# Patient Record
(demographics unavailable — no encounter records)

---

## 2018-03-15 NOTE — RADRPT
EXAM DATE/TIME:  03/15/2018 01:35 

 

[facility] COMPARISON:     

No previous studies available for comparison.

 

 

INDICATIONS :     

Cephalgia.

                      

 

RADIATION DOSE:     

59.98 CTDIvol (mGy) 

 

 

 

MEDICAL HISTORY :     

Hypertension.  

 

SURGICAL HISTORY :      

None. 

 

ENCOUNTER:      

Initial

 

ACUITY:      

1 day

 

PAIN SCALE:      

5/10

 

LOCATION:        

cranial 

 

TECHNIQUE:     

Multiple contiguous axial images were obtained of the head.  Using automated exposure control and adj
ustment of the mA and/or kV according to patient size, radiation dose was kept as low as reasonably a
chievable to obtain optimal diagnostic quality images.   DICOM format image data is available electro
nically for review and comparison.  

 

FINDINGS:     

 

CEREBRUM:     

The ventricles are normal for age.  No evidence of midline shift, mass lesion, hemorrhage or acute in
farction.  No extra-axial fluid collections are seen.

 

POSTERIOR FOSSA:     

The cerebellum and brainstem are intact.  The 4th ventricle is midline.  The cerebellopontine angle i
s unremarkable.

 

EXTRACRANIAL:     

The visualized portion of the orbits is intact. Complete opacification of the ethmoid air cells on th
e left. No bony destruction. Mild mucosal thickening involving the left maxillary sinus without air-f
luid level. Remaining paranasal sinuses and mastoid air cells are clear.

 

SKULL:     

The calvaria is intact.  No evidence of skull fracture.

 

CONCLUSION:     

1. No acute intracranial abnormality.

2. Left ethmoid and left maxillary sinus disease.

 

 

 

 Enmanuel Duncan Jr., MD on March 15, 2018 at 1:47           

Board Certified Radiologist.

 This report was verified electronically.

## 2018-03-15 NOTE — PD
HPI


Chief Complaint:  HTN


Time Seen by Provider:  00:40


Travel History


International Travel<30 days:  No


Contact w/Intl Traveler<30days:  No


Traveled to known affect area:  No





History of Present Illness


HPI


Patient called 911, she complained of slight head throbbing which she usually 

feels when her blood pressure is up.  She checked her blood pressure which read 

as 200 systolic and the patient call 911 for transportation and further 

assistance


This is all that the patient is sharing at this time.  Patient states that she 

was diagnosed with sinusitis and is on Levaquin for it.








Allergic to atorvastatin, Z-Latrell and Coumadin


Past medical history significant for hypercholesterolemia tachyarrhythmia, DVT, 

cholecystectomy, hiatal hernia, hysterectomy, anxiety, depression,





PFSH


Past Medical History


Arthritis:  Yes


Anxiety:  Yes (TAKES VALIUM OCCASIONALLY FOR SLEEP)


Depression:  Yes


Cancer:  No


Cardiovascular Problems:  Yes (HX TACHYARRHYTHMIA)


High Cholesterol:  Yes


Diabetes:  No


Diminished Hearing:  No


Deep Vein Thrombosis:  Yes


Endocrine:  No


Gastrointestinal Disorders:  No (CURRENT ABDOMINAL PAIN)


Genitourinary:  No


Headaches:  Yes


Hepatitis:  No


Hiatal Hernia:  Yes


Hypertension:  Yes


Immune Disorder:  No


Implanted Vascular Access Dvce:  No


Musculoskeletal:  Yes (DDD, ARTHRITIS)


Neurologic:  Yes (POST HERPETIC TRIGEMINAL NEURALGIA, HEADACHES)


Psychiatric:  Yes (ANXIETY, CLAUSTRAPHOBIA)


Reproductive:  No


Respiratory:  No


Seizures:  No


Thyroid Disease:  No


Menopausal:  Yes





Past Surgical History


Abdominal Surgery:  Yes ( CIELO. ING. HERNIA REP.ABD. HERNIA REP. x3, 

CHOLECYSTECTOMY)


AICD:  No


Cardiac Surgery:  No


Cholecystectomy:  Yes


Ear Surgery:  No


Endocrine Surgery:  No


Eye Surgery:  Yes (L CATARACT )


Genitourinary Surgery:  No


Gynecologic Surgery:  Yes (HYSTERECTOMY)


Hysterectomy:  Yes (2000)


Joint Replacement:  No


Neurologic Surgery:  No


Oral Surgery:  No


Pacemaker:  No


Thoracic Surgery:  No


Other Surgery:  Yes (HYSTERECTOMY, CHOLECYSTECTOMY, HERNIA REPAIR)





Social History


Alcohol Use:  Yes (RARELY)


Tobacco Use:  No


Substance Use:  No





Allergies-Medications


(Allergen,Severity, Reaction):  


Coded Allergies:  


     atorvastatin (Verified  Allergy, Severe, MUSCLE PAIN, 3/15/18)


     warfarin (Verified  Allergy, Severe, HIVES, 3/15/18)


     azithromycin (Verified  Adverse Reaction, Severe, abd pain, 3/15/18)


Reported Meds & Prescriptions





Reported Meds & Active Scripts


Active


Reported


Levofloxacin 750 Mg Tablet 750 Mg PO DAILY


Ipratropium Nasal 0.06% Spray 1 Spray EACH NARE QID


Valacyclovir (Valacyclovir HCl) 1,000 Mg Tab 1,000 Mg PO BID


Trazodone (Trazodone HCl) 50 Mg Tab 50 Mg PO HS


Rosuvastatin (Rosuvastatin Calcium) 10 Mg Tab 10 Mg PO HS


Hydrochlorothiazide 25 Mg Tab 25 Mg PO DAILY


Diazepam 5 Mg Tab 2.5 Mg PO HS PRN


Citalopram HBr (Citalopram Hydrobromide) 20 Mg/10 Ml Solution 10 Mg PO DAILY


Carbamazepine 100 Mg Chew 100 Mg CHEW BID


Aspirin 81 Low Dose (Aspirin) 81 Mg Chew 81 Mg CHEW DAILY


Amlodipine (Amlodipine Besylate) 5 Mg Tab 5 Mg PO DAILY








Review of Systems


General / Constitutional:  No: Fever


Eyes:  No: Visual changes


HENT:  Positive: Headaches


Cardiovascular:  No: Chest Pain or Discomfort


Respiratory:  No: Shortness of Breath


Gastrointestinal:  No: Abdominal Pain


Genitourinary:  No: Dysuria


Musculoskeletal:  No: Pain


Skin:  No Rash


Neurologic:  No: Weakness


Psychiatric:  No: Depression


Endocrine:  No: Polydipsia


Hematologic/Lymphatic:  No: Easy Bruising





Physical Exam


Narrative


GENERAL: 


SKIN: Warm and dry.


HEAD: Atraumatic. Normocephalic. 


EYES: Pupils equal and round. No scleral icterus. No injection or drainage. 


ENT: No nasal bleeding or discharge.  Mucous membranes pink and moist.


NECK: Trachea midline. No JVD. 


CARDIOVASCULAR: Regular rate and rhythm.  


RESPIRATORY: No accessory muscle use. Clear to auscultation. Breath sounds 

equal bilaterally. 


GASTROINTESTINAL: Abdomen soft, non-tender, nondistended. 


MUSCULOSKELETAL: Extremities without clubbing, cyanosis, or edema. No obvious 

deformities. 


NEUROLOGICAL: Awake and alert. No obvious cranial nerve deficits.  Motor 

grossly within normal limits. Five out of 5 muscle strength in the arms and 

legs.  Normal speech.


PSYCHIATRIC: Appropriate mood and affect; insight and judgment normal.





Data


Data


Last Documented VS





Vital Signs








  Date Time  Temp Pulse Resp B/P (MAP) Pulse Ox O2 Delivery O2 Flow Rate FiO2


 


3/15/18 03:34  69 16 132/74 (93) 99   


 


3/15/18 02:00      Room Air  


 


3/15/18 00:40 97.5       








Orders





 Orders


Clonidine (Catapres) (3/15/18 00:45)


Ct Brain W/O Iv Contrast(Rout) (3/15/18 00:40)


Lorazepam Inj (Ativan Inj) (3/15/18 02:15)


Ed Discharge Order (3/15/18 02:27)








[facility]


Medical Decision Making


Medical Screen Exam Complete:  Yes


Emergency Medical Condition:  Yes


Medical Record Reviewed:  Yes


Differential Diagnosis


Hypertensive urgency versus intracranial hemorrhage versus sinus headache with 

secondary hypertension


Narrative Course


CT head read by the radiologist as no acute intracranial abnormality, left 

ethmoid and left maxillary sinus disease.





The sinusitis is known to the patient who is already being treated with Levaquin


Patient's hypertension resolved with a single clonidine from systolic of 200s 

down to a systolic of 152





Diagnosis





 Primary Impression:  


 Hypertension


 Qualified Codes:  I10 - Essential (primary) hypertension


Patient Instructions:  General Instructions, Hypertension (DC), Sinusitis (GEN)





***Additional Instructions:  


CONTINUE TAKING YOUR ANTIBIOTIC FOR YOUR SINUSITIS AND SEE YOUR REGULAR DOCTOR 

FOR ANY FURTHER EVALUATION AND CONTROL OF YOUR BLOOD PRESSURE


Disposition:  01 DISCHARGE HOME


Condition:  Stable











Bradly Bravo MD Mar 15, 2018 00:46

## 2018-03-15 NOTE — PD
HPI


Chief Complaint:  GI Complaint


Time Seen by Provider:  23:08


Travel History


International Travel<30 days:  No


Contact w/Intl Traveler<30days:  No


Traveled to known affect area:  No





History of Present Illness


HPI


Patient is an 81-year-old female presents emergency department for evaluation 

of nausea and vomiting.  Patient was seen here yesterday for headache and 

elevated blood pressure, she received clonidine and a CAT scan of her head 

which showed some mild sinusitis and she was started on antibiotic.  She states 

after taking the antibiotic today she started having some nausea and vomiting.  

No labs were checked on her yesterday.  She also admits to having very anxious 

personality.  States she has some mild abdominal cramping but no severe pain.  

She states that her headache is fairly mild currently and is coronal and 

distribution.  She denies any chest pain shortness of breath diarrhea 

constipation extremity pain.  She does endorse tremors





PFSH


Past Medical History


Arthritis:  Yes


Anxiety:  Yes (TAKES VALIUM OCCASIONALLY FOR SLEEP)


Depression:  Yes


Cancer:  No


Cardiovascular Problems:  Yes (HX TACHYARRHYTHMIA)


High Cholesterol:  Yes


Diabetes:  No


Diminished Hearing:  No


Deep Vein Thrombosis:  Yes


Endocrine:  No


Genitourinary:  No


Headaches:  Yes


Hepatitis:  No


Hiatal Hernia:  Yes


Hypertension:  Yes


Immune Disorder:  No


Implanted Vascular Access Dvce:  No


Musculoskeletal:  Yes (DDD, ARTHRITIS)


Neurologic:  Yes (POST HERPETIC TRIGEMINAL NEURALGIA, HEADACHES)


Psychiatric:  Yes (ANXIETY, CLAUSTRAPHOBIA)


Reproductive:  No


Respiratory:  No


Seizures:  No


Thyroid Disease:  No


Pregnant?:  Not Pregnant


Menopausal:  Yes





Past Surgical History


Abdominal Surgery:  Yes ( CIELO. ING. HERNIA REP.ABD. HERNIA REP. x3, 

CHOLECYSTECTOMY)


AICD:  No


Cardiac Surgery:  No


Cholecystectomy:  Yes


Ear Surgery:  No


Endocrine Surgery:  No


Eye Surgery:  Yes (L CATARACT )


Genitourinary Surgery:  No


Gynecologic Surgery:  Yes (HYSTERECTOMY)


Hysterectomy:  Yes (2000)


Joint Replacement:  No


Neurologic Surgery:  No


Oral Surgery:  No


Pacemaker:  No


Thoracic Surgery:  No


Other Surgery:  Yes (HYSTERECTOMY, CHOLECYSTECTOMY, HERNIA REPAIR)





Social History


Alcohol Use:  Yes (RARELY)


Tobacco Use:  No


Substance Use:  No





Allergies-Medications


(Allergen,Severity, Reaction):  


Coded Allergies:  


     atorvastatin (Verified  Allergy, Severe, MUSCLE PAIN, 3/16/18)


     warfarin (Verified  Allergy, Severe, HIVES, 3/16/18)


     azithromycin (Verified  Adverse Reaction, Severe, abd pain, 3/16/18)


Reported Meds & Prescriptions





Reported Meds & Active Scripts


Active


Reported


Levofloxacin 750 Mg Tablet 750 Mg PO DAILY


Ipratropium Nasal 0.06% Spray 1 Spray EACH NARE QID


Trazodone (Trazodone HCl) 50 Mg Tab 50 Mg PO HS


Rosuvastatin (Rosuvastatin Calcium) 10 Mg Tab 10 Mg PO HS


Hydrochlorothiazide 25 Mg Tab 25 Mg PO DAILY


Diazepam 5 Mg Tab 2.5 Mg PO HS PRN


Citalopram HBr (Citalopram Hydrobromide) 20 Mg/10 Ml Solution 10 Mg PO DAILY


Carbamazepine 100 Mg Chew 100 Mg CHEW BID


Aspirin 81 Low Dose (Aspirin) 81 Mg Chew 81 Mg CHEW DAILY


Amlodipine (Amlodipine Besylate) 5 Mg Tab 5 Mg PO DAILY








Review of Systems


Except as stated in HPI:  all other systems reviewed are Neg





Physical Exam


Narrative


GENERAL: Well-developed well-nourished, mildly tremulous, anxious.


SKIN: Focused skin assessment warm/dry.


HEAD: Atraumatic. Normocephalic. 


EYES: Pupils equal and round. No scleral icterus. No injection or drainage. 


ENT: No nasal bleeding or discharge.  Mucous membranes pink and moist.


NECK: Trachea midline. No JVD. 


CARDIOVASCULAR: Regular rate and rhythm.  No murmur appreciated.


RESPIRATORY: No accessory muscle use. Clear to auscultation. Breath sounds 

equal bilaterally. 


GASTROINTESTINAL: Abdomen soft, non-tender, nondistended. Hepatic and splenic 

margins not palpable.  She has demonstrated dry heaves a few times in the 

emergency department


MUSCULOSKELETAL: No obvious deformities. No clubbing.  No cyanosis.  No edema. 


NEUROLOGICAL: Awake and alert.  Cranial nerves II through XII grossly intact 

and nonfocal, 5 out of 5 strength in all 4 extremities, the patient is 

exhibiting some mild essential tremor of bilateral upper extremities but her 

cerebellar testing is normal.


PSYCHIATRIC: Appropriate mood and affect; insight and judgment normal.





Data


Data


Last Documented VS





Vital Signs








  Date Time  Temp Pulse Resp B/P (MAP) Pulse Ox O2 Delivery O2 Flow Rate FiO2


 


3/16/18 00:18  72 20 155/73 (100) 97   


 


3/15/18 23:59      Room Air  


 


3/15/18 22:08 97.8       








Orders





 Orders


Complete Blood Count With Diff (3/15/18 23:14)


Comprehensive Metabolic Panel (3/15/18 23:14)


Lipase (3/15/18 23:14)


Prothrombin Time / Inr (Pt) (3/15/18 23:14)


Act Partial Throm Time (Ptt) (3/15/18 23:14)


Urinalysis - C+S If Indicated (3/15/18 23:14)


Iv Access Insert/Monitor (3/15/18 23:14)


Ecg Monitoring (3/15/18 23:14)


Oximetry (3/15/18 23:14)


Sodium Chloride 0.9% Flush (Ns Flush) (3/15/18 23:15)


Lorazepam Inj (Ativan Inj) (3/15/18 23:15)


Ondansetron Inj (Zofran Inj) (3/15/18 23:30)


Ondansetron Inj (Zofran Inj) (3/16/18 00:45)


Sodium, Random Urine (3/16/18 00:45)


Osmolality, Urine (3/16/18 00:45)


Sodium Chlor 0.9% 1000 Ml Inj (Ns 1000 M (3/16/18 00:45)


Ct Abd/Pel W Iv Contrast(Rout) (3/16/18 00:56)


Iohexol 350 Inj (Omnipaque 350 Inj) (3/16/18 01:28)


Admit Order (Ed Use Only) (3/16/18 )


Vital Signs (Adult) Q4H (3/16/18 02:46)


Diet Heart Healthy (3/16/18 Breakfast)


Notify Dr: Other (3/16/18 02:46)


Ondansetron Inj (Zofran Inj) (3/16/18 03:00)


Activity Oob With Assistance (3/16/18 02:46)





Labs





Laboratory Tests








Test


  3/15/18


23:15 3/15/18


23:45 3/16/18


00:00


 


White Blood Count 8.4 TH/MM3   


 


Red Blood Count 4.59 MIL/MM3   


 


Hemoglobin 13.2 GM/DL   


 


Hematocrit 38.8 %   


 


Mean Corpuscular Volume 84.6 FL   


 


Mean Corpuscular Hemoglobin 28.8 PG   


 


Mean Corpuscular Hemoglobin


Concent 34.1 % 


  


  


 


 


Red Cell Distribution Width 13.4 %   


 


Platelet Count 214 TH/MM3   


 


Mean Platelet Volume 7.7 FL   


 


Neutrophils (%) (Auto) 79.9 %   


 


Lymphocytes (%) (Auto) 12.5 %   


 


Monocytes (%) (Auto) 6.1 %   


 


Eosinophils (%) (Auto) 0.7 %   


 


Basophils (%) (Auto) 0.8 %   


 


Neutrophils # (Auto) 6.6 TH/MM3   


 


Lymphocytes # (Auto) 1.1 TH/MM3   


 


Monocytes # (Auto) 0.5 TH/MM3   


 


Eosinophils # (Auto) 0.1 TH/MM3   


 


Basophils # (Auto) 0.1 TH/MM3   


 


CBC Comment DIFF FINAL   


 


Differential Comment    


 


Prothrombin Time 10.0 SEC   


 


Prothromb Time International


Ratio 1.0 RATIO 


  


  


 


 


Activated Partial


Thromboplast Time 24.0 SEC 


  


  


 


 


Blood Urea Nitrogen 10 MG/DL   


 


Creatinine 0.73 MG/DL   


 


Random Glucose 117 MG/DL   


 


Total Protein 6.9 GM/DL   


 


Albumin 3.4 GM/DL   


 


Calcium Level 8.6 MG/DL   


 


Alkaline Phosphatase 106 U/L   


 


Aspartate Amino Transf


(AST/SGOT) 22 U/L 


  


  


 


 


Alanine Aminotransferase


(ALT/SGPT) 16 U/L 


  


  


 


 


Total Bilirubin 0.4 MG/DL   


 


Sodium Level 121 MEQ/L   


 


Potassium Level 3.7 MEQ/L   


 


Chloride Level 87 MEQ/L   


 


Carbon Dioxide Level 24.5 MEQ/L   


 


Anion Gap 10 MEQ/L   


 


Estimat Glomerular Filtration


Rate 77 ML/MIN 


  


  


 


 


Lipase 67 U/L   


 


Urine Color  YELLOW  


 


Urine Turbidity  CLEAR  


 


Urine pH  6.5  


 


Urine Specific Gravity  1.015  


 


Urine Protein  NEG mg/dL  


 


Urine Glucose (UA)  NEG mg/dL  


 


Urine Ketones  15 mg/dL  


 


Urine Occult Blood  NEG  


 


Urine Nitrite  NEG  


 


Urine Bilirubin  NEG  


 


Urine Urobilinogen  0.2 MG/DL  


 


Urine Leukocyte Esterase  NEG  


 


Urine RBC  0-3 /hpf  


 


Urine WBC  3-5 /hpf  


 


Urine Squamous Epithelial


Cells 


  0-5 /hpf 


  


 


 


Urine Bacteria  NONE /hpf  


 


Microscopic Urinalysis Comment


  


  CULT NOT


INDICATED 


 


 


Urine Osmolality   499 MOSM/KG 


 


Urine Random Sodium   123 MEQ/L 











[facility]


Medical Decision Making


Medical Screen Exam Complete:  Yes


Emergency Medical Condition:  Yes


Differential Diagnosis


Dehydration, electrolyte abnormality, anxiety, acute abdomen unlikely.


Narrative Course


Patient was room to the emergency department, her laboratory workup was 

significant for hyponatremia with a sodium of 121 which may explain her 

tremors.  She is a very anxious person, her blood pressure was significantly 

elevated on arrival but after Ativan was administered IV the patient's blood 

pressure normalized and her tremor ceased.  She has not had any seizure 

activity while in the emergency department is alert and awake and oriented.  

CAT scan of her head was reviewed from yesterday and certainly did show some 

mild chronic sinusitis but nothing acute intracranially.  Her headache had 

completely resolved in the emergency department.  He was fairly mild and she 

did not have any nuchal rigidity.  I do not think there is indication for 

lumbar puncture in this patient at this time.  Patient did have a CT of her 

abdomen given the dry heaves and her sodium and there is no acute intra-

abdominal pathology.


Last 24 hours Impressions








Abdomen/Pelvis CT 3/16/18 0056 Signed





Impressions: 





 Service Date/Time:  Friday, March 16, 2018 01:08 - CONCLUSION:  1. No acute 





 intraperitoneal or pelvic process to explain current clinical symptoms. 2. 





 Stable nature and extrahepatic biliary ductal dilatation is likely related to 





 prior cholecystectomy. 3. Small hiatal hernia. 4. Prior mesh repair of a small 





 anterior abdominal wall hernia. Superior to be at the cephalad extent of the 





 previous midline laparotomy scar.     Dannie Cheng MD 








Results were discussed with the patient I recommend observing her overnight in 

the hospital.  Given her nausea and vomiting I think likely this is hypovolemic 

hyponatremia and she was given 500 cc bolus of normal saline.  Urine sodium and 

urine osmolality were sent as well.  Patient was discussed with Dr. Damon for admission.





Diagnosis





 Primary Impression:  


 Hyponatremia


 Additional Impressions:  


 N&V (nausea and vomiting)


 Headache





Admitting Information


Admitting Physician Requests:  Admit


Condition:  Stable











Pelon Mims MD Mar 15, 2018 23:18

## 2018-03-16 NOTE — RADRPT
EXAM DATE/TIME:  03/16/2018 01:08 

 

[facility] COMPARISON:     

CT ABDOMEN & PELVIS W CONTRAST, November 16, 2014, 21:26.

 

 

INDICATIONS :     

Nausea and vomiting. Lower abdominal pain.

                      

 

IV CONTRAST:     

100 cc Omnipaque 350 (iohexol) IV 

 

 

ORAL CONTRAST:      

No oral contrast ingested.

                      

 

RADIATION DOSE:     

20.65 CTDIvol (mGy) 

 

 

MEDICAL HISTORY :     

Cardiovascular disease.  

 

SURGICAL HISTORY :      

Cholecystectomy. Inguinal hernia repair.Hysterectomy.

 

ENCOUNTER:      

Initial

 

ACUITY:      

1 day

 

PAIN SCALE:      

8/10

 

LOCATION:       

Bilateral lower quadrant 

 

TECHNIQUE:     

Volumetric scanning of the abdomen and pelvis was performed.  Using automated exposure control and ad
justment of the mA and/or kV according to patient size, radiation dose was kept as low as reasonably 
achievable to obtain optimal diagnostic quality images.  DICOM format image data is available electro
nically for review and comparison.  

 

FINDINGS:     

 

LOWER LUNGS:     

The visualized lower lungs are clear. Small hiatal hernia.

 

LIVER:     

Homogeneous density without lesion. There is some intra-and extra hepatic biliary ductal dilatation p
robably representing a capacitance effect associated with prior cholecystectomy.

 

SPLEEN:     

Normal size without lesion.

 

PANCREAS:     

Within normal limits.

 

KIDNEYS:     

Normal in size and shape.  There is no mass, stone or hydronephrosis. Small cortical cyst on the left


 

ADRENAL GLANDS:     

Within normal limits.

 

VASCULAR:     

There is no aortic aneurysm. Atherosclerotic disease in the central portion of both renal arteries wi
th a possible ostial stenosis on the right.

 

BOWEL/MESENTERY:     

The stomach, small bowel, and colon demonstrate no acute abnormality.  There is no free intraperitone
al air or fluid.

 

ABDOMINAL WALL:     

Findings characteristic of a previous midline laparotomy scar with probable mesh repair of a previous
 incisional hernia in the same general vicinity.

 

RETROPERITONEUM:     

There is no lymphadenopathy. 

 

BLADDER:     

No wall thickening or mass. 

 

REPRODUCTIVE:     

Patient appears to be status post hysterectomy.

 

INGUINAL:     

There is no lymphadenopathy or hernia. 

 

MUSCULOSKELETAL:     

Facet hypertrophy again identified at the lumbosacral junction with a mild dextroscoliosis of the lum
bar spine. 

 

CONCLUSION:     

1. No acute intraperitoneal or pelvic process to explain current clinical symptoms.

2. Stable nature and extrahepatic biliary ductal dilatation is likely related to prior cholecystectom
y.

3. Small hiatal hernia.

4. Prior mesh repair of a small anterior abdominal wall hernia. Superior to be at the cephalad extent
 of the previous midline laparotomy scar.

 

 

 

 Dannie Cheng MD on March 16, 2018 at 2:21           

Board Certified Radiologist.

 This report was verified electronically.

## 2018-03-16 NOTE — HHI.HP
HPI


Service


[facility]


Primary Care Physician


Barb Harrison MD


Admission Diagnosis





Symptomatic hyponatremia.  N/V


Chief Complaint:  


nausea and vomit


Travel History


International Travel<30 Days:  No


Contact w/Intl Traveler <30 Da:  No


Traveled to Known Affected Are:  No


History of Present Illness





Patient is an 81-year-old female presents emergency department for evaluation 

of nausea and vomiting.  Patient was seen here yesterday for headache and 

elevated blood pressure, she received clonidine and a CAT scan of her head 

which showed some mild sinusitis and she was started on antibiotic.  She states 

after taking the antibiotic today she started having some nausea and vomiting.  

No labs were checked on her yesterday.  She also admits to having very anxious 

personality.  States she has some mild abdominal cramping but no severe pain.  

She states that her headache is fairly mild currently and is coronal and 

distribution.  She denies any chest pain shortness of breath diarrhea 

constipation extremity pain.  She does endorse tremors.  Patient with history 

of hypertension and is supposed to be on bid 5mg amlodipine will restart at 

that dose and was on levaquin at 750 which started her symptoms also unable to 

tolerate zithromax has been on amoxicillin in past will start for sinus.  

Patient sodium low and started on IV fluid related to possible diuretic and the 

nausea and vomit has been normal in past.  will start on po intake continue IV 

fluid recheck lab in am if stable discharge tomorrow.





Review of Systems


Gastrointestinal:  COMPLAINS OF: Nausea, Vomiting


Psychiatric:  COMPLAINS OF: Anxiety





Past Family Social History


Past Medical History


hypertension,DVT,depression,tachyarrhythmia,hernia djd neuralgia


Past Surgical History


gallbladder,hysterectomy,cataract


Reported Medications


[id number],valium 2.5,crestor 10 citaloprim 10,nasal spray,trazadone 50,

amlodipine 5 bid hctz 25 asa 81 carbamazpine 100 bid


Allergies:  


Coded Allergies:  


     atorvastatin (Verified  Allergy, Severe, MUSCLE PAIN, 3/16/18)


     warfarin (Verified  Allergy, Severe, HIVES, 3/16/18)


     azithromycin (Verified  Adverse Reaction, Severe, abd pain, 3/16/18)


Social History


NS,ND





Physical Exam


Vital Signs





Vital Signs








  Date Time  Temp Pulse Resp B/P (MAP) Pulse Ox O2 Delivery O2 Flow Rate FiO2


 


3/16/18 08:00 96.4 60 16 132/63 (86) 96   


 


3/16/18 04:42 96.8 74 18 145/78 (100) 96   


 


3/16/18 04:27  68  159/74 (102) 96   


 


3/16/18 02:30  80 20 171/84 (113) 98   


 


3/16/18 01:30  75 20 167/81 (109) 97   


 


3/16/18 00:18  72 20 155/73 (100) 97   


 


3/15/18 23:59  72 20 155/73 (100) 97 Room Air  


 


3/15/18 23:59   20     


 


3/15/18 23:30  80 20 176/73 (107) 98   


 


3/15/18 22:08 97.8 75 18 180/79 (112) 97   








Physical Exam


GENERAL: This is a well-nourished, well-developed patient, in no apparent 

distress.


SKIN: No rashes, ecchymoses or lesions. Cool and dry.


HEAD: Atraumatic. Normocephalic. No temporal or scalp tenderness.


EYES: Pupils equal round and reactive. Extraocular motions intact. No scleral 

icterus. No injection or drainage. 


ENT: Nose without bleeding, purulent drainage or septal hematoma. Throat 

without erythema, tonsillar hypertrophy or exudate. Uvula midline. Airway 

patent.


NECK: Trachea midline. No JVD or lymphadenopathy. Supple, nontender, no 

meningeal signs.


CARDIOVASCULAR: Regular rate and rhythm without murmurs, gallops, or rubs. 


RESPIRATORY: Clear to auscultation. Breath sounds equal bilaterally. No wheezes

, rales, or rhonchi.  


GASTROINTESTINAL: Abdomen soft, non-tender, nondistended. No hepato-splenomegaly

, or palpable masses. No guarding.


MUSCULOSKELETAL: Extremities without clubbing, cyanosis, or edema. No joint 

tenderness, effusion, or edema noted. No calf tenderness. Negative Homans sign 

bilaterally.


NEUROLOGICAL: Awake and alert. Cranial nerves II through XII intact.  Motor and 

sensory grossly within normal limits. Five out of 5 muscle strength in all 

muscle groups.  Normal speech.


Laboratory





Laboratory Tests








Test


  3/15/18


23:15 3/15/18


23:45 3/16/18


00:00


 


White Blood Count 8.4   


 


Red Blood Count 4.59   


 


Hemoglobin 13.2   


 


Hematocrit 38.8   


 


Mean Corpuscular Volume 84.6   


 


Mean Corpuscular Hemoglobin 28.8   


 


Mean Corpuscular Hemoglobin


Concent 34.1 


  


  


 


 


Red Cell Distribution Width 13.4   


 


Platelet Count 214   


 


Mean Platelet Volume 7.7   


 


Neutrophils (%) (Auto) 79.9   


 


Lymphocytes (%) (Auto) 12.5   


 


Monocytes (%) (Auto) 6.1   


 


Eosinophils (%) (Auto) 0.7   


 


Basophils (%) (Auto) 0.8   


 


Neutrophils # (Auto) 6.6   


 


Lymphocytes # (Auto) 1.1   


 


Monocytes # (Auto) 0.5   


 


Eosinophils # (Auto) 0.1   


 


Basophils # (Auto) 0.1   


 


CBC Comment DIFF FINAL   


 


Differential Comment    


 


Prothrombin Time 10.0   


 


Prothromb Time International


Ratio 1.0 


  


  


 


 


Activated Partial


Thromboplast Time 24.0 


  


  


 


 


Blood Urea Nitrogen 10   


 


Creatinine 0.73   


 


Random Glucose 117   


 


Total Protein 6.9   


 


Albumin 3.4   


 


Calcium Level 8.6   


 


Alkaline Phosphatase 106   


 


Aspartate Amino Transf


(AST/SGOT) 22 


  


  


 


 


Alanine Aminotransferase


(ALT/SGPT) 16 


  


  


 


 


Total Bilirubin 0.4   


 


Sodium Level 121   


 


Potassium Level 3.7   


 


Chloride Level 87   


 


Carbon Dioxide Level 24.5   


 


Anion Gap 10   


 


Estimat Glomerular Filtration


Rate 77 


  


  


 


 


Lipase 67   


 


Urine Color  YELLOW  


 


Urine Turbidity  CLEAR  


 


Urine pH  6.5  


 


Urine Specific Gravity  1.015  


 


Urine Protein  NEG  


 


Urine Glucose (UA)  NEG  


 


Urine Ketones  15  


 


Urine Occult Blood  NEG  


 


Urine Nitrite  NEG  


 


Urine Bilirubin  NEG  


 


Urine Urobilinogen  0.2  


 


Urine Leukocyte Esterase  NEG  


 


Urine RBC  0-3  


 


Urine WBC  3-5  


 


Urine Squamous Epithelial


Cells 


  0-5 


  


 


 


Urine Bacteria  NONE  


 


Microscopic Urinalysis Comment


  


  CULT NOT


INDICATED 


 


 


Urine Osmolality   499 


 


Urine Random Sodium   123 








Result Diagram:  


3/15/18 2315                                                                   

             3/15/18 2315





Imaging


CT head sinusitis,cxr no acute changes ekg no acute changes


Course


started on IV fluid and zofran





Septic Shock Reassessment


Septic shock perfusion:  reassessment completed





Caprini VTE Risk Assessment


Caprini VTE Risk Assessment:  Mod/High Risk (score >= 2)


Caprini Risk Assessment Model











 Point Value = 1          Point Value = 2  Point Value = 3  Point Value = 5


 


Age 41-60


Minor surgery


BMI > 25 kg/m2


Swollen legs


Varicose veins


Pregnancy or postpartum


History of unexplained or recurrent


   spontaneous 


Oral contraceptives or hormone


   replacement


Sepsis (< 1 month)


Serious lung disease, including


   pneumonia (< 1 month)


Abnormal pulmonary function


Acute myocardial infarction


Congestive heart failure (< 1 month)


History of inflammatory bowel disease


Medical patient at bed rest Age 61-74


Arthroscopic surgery


Major open surgery (> 45 min)


Laparoscopic surgery (> 45 min)


Malignancy


Confined to bed (> 72 hours)


Immobilizing plaster cast


Central venous access Age >= 75


History of VTE


Family history of VTE


Factor V Leiden


Prothrombin [id number]


Lupus anticoagulant


Anticardiolipin antibodies


Elevated serum homocysteine


Heparin-induced thrombocytopenia


Other congenital or acquired


   thrombophilia Stroke (< 1 month)


Elective arthroplasty


Hip, pelvis, or leg fracture


Acute spinal cord injury (< 1 month)








Prophylaxis Regimen











   Total Risk


Factor Score Risk Level Prophylaxis Regimen


 


0-1      Low Early ambulation


 


2 Moderate Order ONE of the following:


*Sequential Compression Device (SCD)


*Heparin 5000 units SQ BID


 


3-4 Higher Order ONE of the following medications:


*Heparin 5000 units SQ TID


*Enoxaparin/Lovenox 40 mg SQ daily (WT < 150 kg, CrCl > 30 mL/min)


*Enoxaparin/Lovenox 30 mg SQ daily (WT < 150 kg, CrCl > 10-29 mL/min)


*Enoxaparin/Lovenox 30 mg SQ BID (WT < 150 kg, CrCl > 30 mL/min)


AND/OR


*Sequential Compression Device (SCD)


 


5 or more Highest Order ONE of the following medications:


*Heparin 5000 units SQ TID (Preferred with Epidurals)


*Enoxaparin/Lovenox 40 mg SQ daily (WT < 150 kg, CrCl > 30 mL/min)


*Enoxaparin/Lovenox 30 mg SQ daily (WT < 150 kg, CrCl > 10-29 mL/min)


*Enoxaparin/Lovenox 30 mg SQ BID (WT < 150 kg, CrCl > 30 mL/min)


AND


*Sequential Compression Device (SCD)











Assessment and Plan


Problem List:  


(1) Hyponatremia


ICD Codes:  E87.1 - Hypo-osmolality and hyponatremia


Status:  Acute


Plan:  probably related to N and v and is on diuretic will hold diuretic give 

IV fluid follow up labs try po intake





(2) N&V (nausea and vomiting)


ICD Codes:  R11.2 - Nausea with vomiting, unspecified


Status:  Acute


Plan:  a little better with zofran will continue for now





(3) Hypertension


ICD Codes:  I10 - Hypertension


Status:  Acute


Plan:  continue amlodipine was given clonidine in er yesterday which also make 

her not feel well if needed can add losartan





(4) Sinusitis


ICD Codes:  J32.9 - Chronic sinusitis, unspecified


Plan:  will stop levaquin and start amoxicillin





Assessment and Plan


as aboe recheck labs


Code Status


full


Discussed Condition With


patient 





Physician Certification


2 Midnight Certification Type:  Admission for Inpatient Services


Order for Inpatient Services


The services are ordered in accordance with Medicare regulations or non-

Medicare payer requirements, as applicable.  In the case of services not 

specified as inpatient-only, they are appropriately provided as inpatient 

services in accordance with the 2-midnight benchmark.


Estimated LOS (days):  2


2 days is the estimated time the patient will need to remain in the hospital, 

assuming treatment plan goals are met and no additional complications.


Post-Hospital Plan:  Not yet determined











Reji Damon MD Mar 16, 2018 11:18

## 2018-03-16 NOTE — EKG
Date Performed: 03/15/2018       Time Performed: 23:05:08

 

PTAGE:      81 years

 

EKG:      Sinus rhythm 

 

 NORMAL ECG

 

PREVIOUS TRACING       : 01/23/2015 08.35 Since the previous tracing, no significant change noted

 

DOCTOR:   Chip Crandall  Interpretating Date/Time  03/16/2018 11:16:08

## 2018-03-17 NOTE — HHI.PR
Subjective


Remarks


Patient reports he feels much better today.  Tolerated regular meal last night.

  No more nausea or vomiting.  Strength is improved.


Has ambulated in the room to use the restroom without difficulty per her report.





Objective


Vitals





Vital Signs








  Date Time  Temp Pulse Resp B/P (MAP) Pulse Ox O2 Delivery O2 Flow Rate FiO2


 


3/17/18 00:00 96.5 72 20 166/74 (104) 95   


 


3/16/18 20:00 97.0 64 20 148/75 (99) 96   


 


3/16/18 16:00 98.1 64 16 155/68 (97) 95   


 


3/16/18 12:00 97.5 98 16 137/62 (87) 96   


 


3/16/18 08:00 96.4 60 16 132/63 (86) 96   








GENERAL: No acute distress, alert and oriented.  Pleasant.


SKIN: Warm and dry.


HEAD: Normocephalic.


EYES: No scleral icterus. No injection or drainage. 


NECK: Supple, trachea midline. No JVD or lymphadenopathy.


CARDIOVASCULAR: Regular rate and rhythm without murmurs, gallops, or rubs. 


RESPIRATORY: Breath sounds equal bilaterally. No accessory muscle use.


GASTROINTESTINAL: Abdomen soft, non-tender, nondistended.  Bowel sounds normal.


MUSCULOSKELETAL: No cyanosis, or edema. 


BACK: Nontender without obvious deformity. No CVA tenderness.





Result Diagram:  


3/15/18 2315                                                                   

             3/15/18 2315





Imaging


CT head sinusitis,cxr no acute changes ekg no acute changes


Urinary Catheter:  No


Vascular Central Line Catheter:  No





A/P


Problem List:  


(1) Hyponatremia


ICD Codes:  E87.1 - Hypo-osmolality and hyponatremia


Status:  Acute


Plan:  probably related to N and v and is on diuretic.  Diuretic had been held.


Patient tolerating oral intake well.  Labs pending this a.m.


Plan discharge home later today.





(2) N&V (nausea and vomiting)


ICD Codes:  R11.2 - Nausea with vomiting, unspecified


Status:  Acute


Plan:  Much improved and tolerating oral intake.





(3) Hypertension


ICD Codes:  I10 - Hypertension


Status:  Acute


Plan:  continue amlodipine was given clonidine in er yesterday which also make 

her not feel well if needed can add losartan





(4) Sinusitis


ICD Codes:  J32.9 - Chronic sinusitis, unspecified


Status:  Acute


Plan:  Stopped Levaquin and started amoxicillin





Discharge Planning


Hopefully discharge home later today











Chaz Rodríguez MD PhD Mar 17, 2018 07:30

## 2018-03-17 NOTE — HHI.DS
Discharge Summary


Admission Date


Mar 16, 2018 at 02:50


Discharge Date:  Mar 17, 2018


Admitting Diagnosis





Symptomatic hyponatremia.  N/V





(1) Hyponatremia


Diagnosis:  Principal


ICD Codes:  E87.1 - Hypo-osmolality and hyponatremia


Status:  Acute


(2) N&V (nausea and vomiting)


Diagnosis:  Principal


ICD Codes:  R11.2 - Nausea with vomiting, unspecified


Status:  Acute


(3) Hypertension


Diagnosis:  Secondary


ICD Codes:  I10 - Hypertension


Status:  Acute


(4) Sinusitis


Diagnosis:  Secondary


ICD Codes:  J32.9 - Chronic sinusitis, unspecified


Status:  Acute


Brief History





Patient is an 81-year-old female presents emergency department for evaluation 

of nausea and vomiting.  Patient was seen here yesterday for headache and 

elevated blood pressure, she received clonidine and a CAT scan of her head 

which showed some mild sinusitis and she was started on antibiotic.  She states 

after taking the antibiotic today she started having some nausea and vomiting.  

No labs were checked on her yesterday.  She also admits to having very anxious 

personality.  States she has some mild abdominal cramping but no severe pain.  

She states that her headache is fairly mild currently and is coronal and 

distribution.  She denies any chest pain shortness of breath diarrhea 

constipation extremity pain.  She does endorse tremors.  Patient with history 

of hypertension and is supposed to be on bid 5mg amlodipine will restart at 

that dose and was on levaquin at 750 which started her symptoms also unable to 

tolerate zithromax has been on amoxicillin in past will start for sinus.  

Patient sodium low and started on IV fluid related to possible diuretic and the 

nausea and vomit has been normal in past.  will start on po intake continue IV 

fluid recheck lab in am if stable discharge tomorrow.


CBC/BMP:  


3/15/18 2315                                                                   

             3/17/18 0615





Significant Findings





Laboratory Tests








Test


  3/15/18


23:15 3/15/18


23:45 3/16/18


00:00 3/17/18


06:15


 


Neutrophils (%) (Auto)


  79.9 %


(16.0-70.0) 


  


  


 


 


Activated Partial


Thromboplast Time 24.0 SEC


(24.3-30.1) 


  


  


 


 


Random Glucose


  117 MG/DL


() 


  


  


 


 


Sodium Level


  121 MEQ/L


(136-145) 


  


  131 MEQ/L


(136-145)


 


Chloride Level


  87 MEQ/L


() 


  


  97 MEQ/L


()


 


Estimat Glomerular Filtration


Rate 77 ML/MIN


(>89) 


  


  


 


 


Lipase


  67 U/L


() 


  


  


 


 


Urine Ketones  15 mg/dL (NEG)   


 


Calcium Level


  


  


  


  7.8 MG/DL


(8.5-10.1)


 


Potassium Level


  


  


  


  3.4 MEQ/L


(3.5-5.1)








Hospital Course


Pt admitted for n/v with symptomatic hyponatremia with Na of 121.  She was 

given IVF and antiemetics.


She improved significantly and was able to tolerate oral intake w/o difficulty.


Sodium on day of d/c was 131. 


Will hold hctz. Added Amlodipine.


She will f/u with PCP next week.


Pt Condition on Discharge:  Stable


Discharge Disposition:  Discharge Home


Discharge Instructions


DIET: Follow Instructions for:  Heart Healthy Diet


Additional Diet Instructions:  


advance diet slowly as tolerated


Activities you can perform:  Regular-No Restrictions


Follow up Referrals:  


PCP Follow-up





New Orders:  


BASIC METABOLIC PROF - 2-3 Days





New Medications:  


Ondansetron Odt (Ondansetron Odt) 4 Mg Tab


4 MG SL Q6HR PRN for Nausea/Vomiting, #12 TAB 0 Refills





Amlodipine (Norvasc) 5 Mg Tab


5 MG PO DAILY for htn, #31 TAB





Amoxicillin (Amoxicillin) 250 Mg Cap


250 MG PO TID for sinusitis, #15 CAP





 


Continued Medications:  


Aspirin (Aspirin 81 Low Dose) 81 Mg Chew


81 MG CHEW DAILY, #30 TAB





Carbamazepine (Carbamazepine) 100 Mg Chew


100 MG CHEW BID, #60 TAB 0 Refills





Citalopram Hydrobromide (Citalopram HBr) 20 Mg/10 Ml Solution


10 MG PO DAILY





Diazepam (Diazepam) 5 Mg Tab


2.5 MG PO HS PRN for ANXIETY, TAB 0 Refills





Rosuvastatin (Rosuvastatin) 10 Mg Tab


10 MG PO HS for Cholesterol Management, TAB 0 Refills





Trazodone (Trazodone) 50 Mg Tab


50 MG PO HS for Control Depression, #30 TAB 0 Refills

















Chaz Rodríguez MD PhD Mar 17, 2018 08:15

## 2018-03-21 NOTE — PD
HPI


Chief Complaint:  Cold / Flu Symptoms


Time Seen by Provider:  11:33


Travel History


International Travel<30 days:  No


Contact w/Intl Traveler<30days:  No


Traveled to known affect area:  No





History of Present Illness


HPI


This 81-year-old female is complaining of some pain in her sinus area.  She was 

in the hospital recently with symptomatic hyponatremia.  On March 15 she had a 

CT scan of the head done.  Intracranial was negative but she was noted to have 

left ethmoid and maxillary sinusitis at that time.  She had recently been on 

Levaquin but she says that was too strong for her and most recently she has 

been on amoxicillin 250 3 times daily.  She was given a 5 day course and is 

about to finish it.  Her sodium at the time of admission was 121.  She says he 

been eating well.  She has occasional nausea.





PFSH


Past Medical History


Arthritis:  Yes


Anxiety:  Yes (TAKES VALIUM OCCASIONALLY FOR SLEEP)


Depression:  Yes


Heart Rhythm Problems:  Yes


Cancer:  No


Cardiovascular Problems:  Yes (HX TACHYARRHYTHMIA)


High Cholesterol:  Yes


Diabetes:  No


Diminished Hearing:  No


Deep Vein Thrombosis:  Yes


Endocrine:  No


Genitourinary:  No


Headaches:  Yes


Hepatitis:  No


Hiatal Hernia:  Yes


Hypertension:  Yes


Immune Disorder:  No


Implanted Vascular Access Dvce:  No


Musculoskeletal:  Yes (DDD, ARTHRITIS)


Neurologic:  Yes (POST HERPETIC TRIGEMINAL NEURALGIA, HEADACHES)


Psychiatric:  Yes (ANXIETY, CLAUSTRAPHOBIA)


Reproductive:  No


Respiratory:  No


Seizures:  No


Thyroid Disease:  No


Influenza Vaccination:  Yes


Pregnant?:  Not Pregnant


Menopausal:  Yes





Past Surgical History


Abdominal Surgery:  Yes ( CIELO. ING. HERNIA REP.ABD. HERNIA REP. x3, 

CHOLECYSTECTOMY)


AICD:  No


Cardiac Surgery:  No


Cholecystectomy:  Yes


Ear Surgery:  No


Endocrine Surgery:  No


Eye Surgery:  Yes (L CATARACT )


Genitourinary Surgery:  No


Gynecologic Surgery:  Yes (HYSTERECTOMY)


Hysterectomy:  Yes (2000)


Joint Replacement:  No


Neurologic Surgery:  No


Oral Surgery:  No


Pacemaker:  No


Thoracic Surgery:  No


Other Surgery:  Yes (HYSTERECTOMY, CHOLECYSTECTOMY, HERNIA REPAIR)





Social History


Alcohol Use:  Yes (RARELY)


Tobacco Use:  No


Substance Use:  No





Allergies-Medications


(Allergen,Severity, Reaction):  


Coded Allergies:  


     atorvastatin (Verified  Allergy, Severe, MUSCLE PAIN, 3/21/18)


     warfarin (Verified  Allergy, Severe, HIVES, 3/21/18)


     azithromycin (Verified  Adverse Reaction, Severe, abd pain, 3/21/18)


Reported Meds & Prescriptions





Reported Meds & Active Scripts


Active


Norvasc (Amlodipine Besylate) 5 Mg Tab 5 Mg PO DAILY


Amoxicillin 250 Mg Cap 250 Mg PO TID


Reported


Ipratropium Nasal 0.06% Spray 1 Spray EACH NARE QID


Trazodone (Trazodone HCl) 50 Mg Tab 50 Mg PO HS


Rosuvastatin (Rosuvastatin Calcium) 10 Mg Tab 10 Mg PO HS


Diazepam 5 Mg Tab 2.5 Mg PO HS PRN


Aspirin 81 Low Dose (Aspirin) 81 Mg Chew 81 Mg CHEW DAILY








Review of Systems


General / Constitutional:  No: Fever, Chills


Eyes:  No: Diploplia, Visual changes


HENT:  Positive: Headaches, Sore Throat, Rhinitis


Cardiovascular:  No: Chest Pain or Discomfort, Palpitations


Respiratory:  No: Shortness of Breath


Gastrointestinal:  Positive: Nausea


Musculoskeletal:  No: Myalgias


Skin:  No Rash


Neurologic:  No: Weakness, Dizziness





Physical Exam


Narrative


GENERAL: Well-developed female


SKIN: Focused skin assessment warm/dry.


HEAD: Atraumatic. Normocephalic. 


EYES: Pupils equal and round. No scleral icterus. No injection or drainage. 


ENT: No nasal bleeding or discharge.  Mucous membranes pink and moist.  There 

is tenderness over the maxillary and frontal sinuses


NECK: Trachea midline. No JVD. 


CARDIOVASCULAR: Regular rate and rhythm.  No murmur appreciated.


RESPIRATORY: No accessory muscle use. Clear to auscultation. Breath sounds 

equal bilaterally. 


GASTROINTESTINAL: Abdomen soft, non-tender, nondistended. Hepatic and splenic 

margins not palpable. 


MUSCULOSKELETAL: No obvious deformities. No clubbing.  No cyanosis.  No edema. 


NEUROLOGICAL: Awake and alert. No obvious cranial nerve deficits.  Motor 

grossly within normal limits. Normal speech.


PSYCHIATRIC: Appropriate mood and affect; insight and judgment normal.





Data


Data


Last Documented VS





Vital Signs








  Date Time  Temp Pulse Resp B/P (MAP) Pulse Ox O2 Delivery O2 Flow Rate FiO2


 


3/21/18 11:25 98.6 78 18 191/86 (121) 95   








Orders





 Orders


Complete Blood Count With Diff (3/21/18 11:50)


Basic Metabolic Panel (Bmp) (3/21/18 11:50)





Labs





Laboratory Tests








Test


  3/21/18


12:04


 


White Blood Count 5.9 TH/MM3 


 


Red Blood Count 4.55 MIL/MM3 


 


Hemoglobin 13.1 GM/DL 


 


Hematocrit 38.7 % 


 


Mean Corpuscular Volume 85.0 FL 


 


Mean Corpuscular Hemoglobin 28.8 PG 


 


Mean Corpuscular Hemoglobin


Concent 33.9 % 


 


 


Red Cell Distribution Width 13.4 % 


 


Platelet Count 278 TH/MM3 


 


Mean Platelet Volume 6.9 FL 


 


Neutrophils (%) (Auto) 75.9 % 


 


Lymphocytes (%) (Auto) 14.4 % 


 


Monocytes (%) (Auto) 6.3 % 


 


Eosinophils (%) (Auto) 1.2 % 


 


Basophils (%) (Auto) 2.2 % 


 


Neutrophils # (Auto) 4.4 TH/MM3 


 


Lymphocytes # (Auto) 0.9 TH/MM3 


 


Monocytes # (Auto) 0.4 TH/MM3 


 


Eosinophils # (Auto) 0.1 TH/MM3 


 


Basophils # (Auto) 0.1 TH/MM3 


 


CBC Comment DIFF FINAL 


 


Differential Comment  


 


Blood Urea Nitrogen 10 MG/DL 


 


Creatinine 0.59 MG/DL 


 


Random Glucose 95 MG/DL 


 


Calcium Level 8.7 MG/DL 


 


Sodium Level 134 MEQ/L 


 


Potassium Level 3.9 MEQ/L 


 


Chloride Level 98 MEQ/L 


 


Carbon Dioxide Level 29.1 MEQ/L 


 


Anion Gap 7 MEQ/L 


 


Estimat Glomerular Filtration


Rate 98 ML/MIN 


 











MDM


Medical Decision Making


Medical Screen Exam Complete:  Yes


Emergency Medical Condition:  Yes


Medical Record Reviewed:  Yes


Differential Diagnosis


Differential includes partially treated sinusitis, hyponatremia


Narrative Course


Sodium today is 134.  There is only given 5 days of treatment for her 

sinusitis.  She apparently is sensitive to antibiotics but tolerated 250 

amoxicillin





Diagnosis





 Primary Impression:  


 Sinusitis


Scripts


Amoxicillin (Amoxicillin) 250 Mg Cap


250 MG PO TID for Infection for 10 Days, CAP 0 Refills


   Prov: Josse Lang MD         3/21/18


Disposition:  01 DISCHARGE HOME


Condition:  Stable











Josse Lang MD Mar 21, 2018 12:01